# Patient Record
Sex: FEMALE | Race: BLACK OR AFRICAN AMERICAN | Employment: OTHER | URBAN - METROPOLITAN AREA
[De-identification: names, ages, dates, MRNs, and addresses within clinical notes are randomized per-mention and may not be internally consistent; named-entity substitution may affect disease eponyms.]

---

## 2022-07-10 ENCOUNTER — HOSPITAL ENCOUNTER (EMERGENCY)
Age: 32
Discharge: HOME | End: 2022-07-10
Attending: EMERGENCY MEDICINE
Payer: MEDICAID

## 2022-07-10 ENCOUNTER — APPOINTMENT (OUTPATIENT)
Dept: GENERAL RADIOLOGY | Age: 32
End: 2022-07-10
Attending: EMERGENCY MEDICINE
Payer: MEDICAID

## 2022-07-10 VITALS
DIASTOLIC BLOOD PRESSURE: 106 MMHG | WEIGHT: 220 LBS | HEIGHT: 62 IN | HEART RATE: 84 BPM | RESPIRATION RATE: 20 BRPM | BODY MASS INDEX: 40.48 KG/M2 | TEMPERATURE: 98 F | OXYGEN SATURATION: 98 % | SYSTOLIC BLOOD PRESSURE: 162 MMHG

## 2022-07-10 DIAGNOSIS — R07.9 CHEST PAIN, UNSPECIFIED TYPE: Primary | ICD-10-CM

## 2022-07-10 LAB
ALBUMIN SERPL-MCNC: 4.1 G/DL (ref 3.5–5)
ALBUMIN/GLOB SERPL: 0.9 {RATIO} (ref 1.1–2.2)
ALP SERPL-CCNC: 106 U/L (ref 45–117)
ALT SERPL-CCNC: 19 U/L (ref 12–78)
ANION GAP SERPL CALC-SCNC: 10 MMOL/L (ref 5–15)
AST SERPL-CCNC: 11 U/L (ref 15–37)
BASOPHILS # BLD: 0.1 K/UL (ref 0–0.1)
BASOPHILS NFR BLD: 1 % (ref 0–1)
BILIRUB SERPL-MCNC: 0.3 MG/DL (ref 0.2–1)
BUN SERPL-MCNC: 8 MG/DL (ref 6–20)
BUN/CREAT SERPL: 9 (ref 12–20)
CALCIUM SERPL-MCNC: 8.9 MG/DL (ref 8.5–10.1)
CHLORIDE SERPL-SCNC: 106 MMOL/L (ref 97–108)
CO2 SERPL-SCNC: 27 MMOL/L (ref 21–32)
CREAT SERPL-MCNC: 0.94 MG/DL (ref 0.55–1.02)
DIFFERENTIAL METHOD BLD: ABNORMAL
EOSINOPHIL # BLD: 0.1 K/UL (ref 0–0.4)
EOSINOPHIL NFR BLD: 1 % (ref 0–7)
ERYTHROCYTE [DISTWIDTH] IN BLOOD BY AUTOMATED COUNT: 15.9 % (ref 11.5–14.5)
GLOBULIN SER CALC-MCNC: 4.5 G/DL (ref 2–4)
GLUCOSE SERPL-MCNC: 85 MG/DL (ref 65–100)
HCG SERPL QL: NEGATIVE
HCT VFR BLD AUTO: 38.5 % (ref 35–47)
HGB BLD-MCNC: 12.1 G/DL (ref 11.5–16)
IMM GRANULOCYTES # BLD AUTO: 0 K/UL (ref 0–0.04)
IMM GRANULOCYTES NFR BLD AUTO: 0 % (ref 0–0.5)
LIPASE SERPL-CCNC: 89 U/L (ref 73–393)
LYMPHOCYTES # BLD: 2.8 K/UL (ref 0.8–3.5)
LYMPHOCYTES NFR BLD: 32 % (ref 12–49)
MCH RBC QN AUTO: 24.4 PG (ref 26–34)
MCHC RBC AUTO-ENTMCNC: 31.4 G/DL (ref 30–36.5)
MCV RBC AUTO: 77.6 FL (ref 80–99)
MONOCYTES # BLD: 0.5 K/UL (ref 0–1)
MONOCYTES NFR BLD: 5 % (ref 5–13)
NEUTS SEG # BLD: 5.2 K/UL (ref 1.8–8)
NEUTS SEG NFR BLD: 61 % (ref 32–75)
NRBC # BLD: 0 K/UL (ref 0–0.01)
NRBC BLD-RTO: 0 PER 100 WBC
PLATELET # BLD AUTO: 300 K/UL (ref 150–400)
PMV BLD AUTO: 11.2 FL (ref 8.9–12.9)
POTASSIUM SERPL-SCNC: 3.8 MMOL/L (ref 3.5–5.1)
PROT SERPL-MCNC: 8.6 G/DL (ref 6.4–8.2)
RBC # BLD AUTO: 4.96 M/UL (ref 3.8–5.2)
SODIUM SERPL-SCNC: 143 MMOL/L (ref 136–145)
TROPONIN-HIGH SENSITIVITY: <4 NG/L (ref 0–51)
WBC # BLD AUTO: 8.6 K/UL (ref 3.6–11)

## 2022-07-10 PROCEDURE — 96374 THER/PROPH/DIAG INJ IV PUSH: CPT

## 2022-07-10 PROCEDURE — 85025 COMPLETE CBC W/AUTO DIFF WBC: CPT

## 2022-07-10 PROCEDURE — 99285 EMERGENCY DEPT VISIT HI MDM: CPT

## 2022-07-10 PROCEDURE — 84484 ASSAY OF TROPONIN QUANT: CPT

## 2022-07-10 PROCEDURE — 80053 COMPREHEN METABOLIC PANEL: CPT

## 2022-07-10 PROCEDURE — 71046 X-RAY EXAM CHEST 2 VIEWS: CPT

## 2022-07-10 PROCEDURE — 84703 CHORIONIC GONADOTROPIN ASSAY: CPT

## 2022-07-10 PROCEDURE — 83690 ASSAY OF LIPASE: CPT

## 2022-07-10 PROCEDURE — 74011250636 HC RX REV CODE- 250/636: Performed by: EMERGENCY MEDICINE

## 2022-07-10 PROCEDURE — 36415 COLL VENOUS BLD VENIPUNCTURE: CPT

## 2022-07-10 PROCEDURE — 93005 ELECTROCARDIOGRAM TRACING: CPT

## 2022-07-10 RX ORDER — KETOROLAC TROMETHAMINE 30 MG/ML
30 INJECTION, SOLUTION INTRAMUSCULAR; INTRAVENOUS
Status: COMPLETED | OUTPATIENT
Start: 2022-07-10 | End: 2022-07-10

## 2022-07-10 RX ORDER — LABETALOL 300 MG/1
300 TABLET, FILM COATED ORAL 2 TIMES DAILY
COMMUNITY
End: 2022-09-07 | Stop reason: ALTCHOICE

## 2022-07-10 RX ADMIN — KETOROLAC TROMETHAMINE 30 MG: 30 INJECTION, SOLUTION INTRAMUSCULAR; INTRAVENOUS at 18:24

## 2022-07-10 NOTE — ED PROVIDER NOTES
7:19 PM  Change of shift. Care of patient taken over from Dr. Etelvina Leonardo; H&P reviewed, bedside handoff complete. Awaiting diagnostics and disposition    VITAL SIGNS:  Patient Vitals for the past 4 hrs:   BP   07/10/22 2017 (!) 162/106         LABS:  Recent Results (from the past 6 hour(s))   EKG, 12 LEAD, INITIAL    Collection Time: 07/10/22  5:25 PM   Result Value Ref Range    Ventricular Rate 82 BPM    Atrial Rate 82 BPM    P-R Interval 110 ms    QRS Duration 88 ms    Q-T Interval 356 ms    QTC Calculation (Bezet) 415 ms    Calculated P Axis 7 degrees    Calculated R Axis 33 degrees    Calculated T Axis 18 degrees    Diagnosis       Sinus rhythm with short NM  Otherwise normal ECG  No previous ECGs available     CBC WITH AUTOMATED DIFF    Collection Time: 07/10/22  6:20 PM   Result Value Ref Range    WBC 8.6 3.6 - 11.0 K/uL    RBC 4.96 3.80 - 5.20 M/uL    HGB 12.1 11.5 - 16.0 g/dL    HCT 38.5 35.0 - 47.0 %    MCV 77.6 (L) 80.0 - 99.0 FL    MCH 24.4 (L) 26.0 - 34.0 PG    MCHC 31.4 30.0 - 36.5 g/dL    RDW 15.9 (H) 11.5 - 14.5 %    PLATELET 791 345 - 081 K/uL    MPV 11.2 8.9 - 12.9 FL    NRBC 0.0 0  WBC    ABSOLUTE NRBC 0.00 0.00 - 0.01 K/uL    NEUTROPHILS 61 32 - 75 %    LYMPHOCYTES 32 12 - 49 %    MONOCYTES 5 5 - 13 %    EOSINOPHILS 1 0 - 7 %    BASOPHILS 1 0 - 1 %    IMMATURE GRANULOCYTES 0 0.0 - 0.5 %    ABS. NEUTROPHILS 5.2 1.8 - 8.0 K/UL    ABS. LYMPHOCYTES 2.8 0.8 - 3.5 K/UL    ABS. MONOCYTES 0.5 0.0 - 1.0 K/UL    ABS. EOSINOPHILS 0.1 0.0 - 0.4 K/UL    ABS. BASOPHILS 0.1 0.0 - 0.1 K/UL    ABS. IMM.  GRANS. 0.0 0.00 - 0.04 K/UL    DF AUTOMATED     METABOLIC PANEL, COMPREHENSIVE    Collection Time: 07/10/22  6:20 PM   Result Value Ref Range    Sodium 143 136 - 145 mmol/L    Potassium 3.8 3.5 - 5.1 mmol/L    Chloride 106 97 - 108 mmol/L    CO2 27 21 - 32 mmol/L    Anion gap 10 5 - 15 mmol/L    Glucose 85 65 - 100 mg/dL    BUN 8 6 - 20 MG/DL    Creatinine 0.94 0.55 - 1.02 MG/DL    BUN/Creatinine ratio 9 (L) 12 - 20      GFR est AA >60 >60 ml/min/1.73m2    GFR est non-AA >60 >60 ml/min/1.73m2    Calcium 8.9 8.5 - 10.1 MG/DL    Bilirubin, total 0.3 0.2 - 1.0 MG/DL    ALT (SGPT) 19 12 - 78 U/L    AST (SGOT) 11 (L) 15 - 37 U/L    Alk. phosphatase 106 45 - 117 U/L    Protein, total 8.6 (H) 6.4 - 8.2 g/dL    Albumin 4.1 3.5 - 5.0 g/dL    Globulin 4.5 (H) 2.0 - 4.0 g/dL    A-G Ratio 0.9 (L) 1.1 - 2.2     LIPASE    Collection Time: 07/10/22  6:20 PM   Result Value Ref Range    Lipase 89 73 - 393 U/L   HCG QL SERUM    Collection Time: 07/10/22  6:20 PM   Result Value Ref Range    HCG, Ql. Negative NEG     TROPONIN-HIGH SENSITIVITY    Collection Time: 07/10/22  6:21 PM   Result Value Ref Range    Troponin-High Sensitivity <4 0 - 51 ng/L        IMAGING:  XR CHEST PA LAT   Final Result    No acute cardiopulmonary disease radiographically. .  . Medications During Visit:  Medications   ketorolac (TORADOL) injection 30 mg (30 mg IntraVENous Given 7/10/22 1824)         DECISION MAKING:  Tanner Sandoval is a 27 y.o. female who comes in as above. Well-appearing patient. Most likely musculoskeletal in nature. We will follow-up with PCP. Return as needed      IMPRESSION:  1.  Chest pain, unspecified type        DISPOSITION:  Discharged      Discharge Medication List as of 7/10/2022  8:01 PM           Follow-up Information       Follow up With Specialties Details Why Contact Info    Steph Garza  [facility] Vascular Surgery, Interventional Cardiology Physician, Cardiovascular Disease Physician Schedule an appointment as soon as possible for a visit  As needed [address] [place] [address]  [phone]      [facility] [address] [place] [address] Emergency Medicine  As needed, If symptoms worsen [id number] [address]   As needed [address] [phone]              The patient is asked to follow-up with their primary care provider in the next several days. They are to call tomorrow for an appointment. The patient is asked to return promptly for any increased concerns or worsening of symptoms. They can return to this emergency department or any other emergency department.

## 2022-07-10 NOTE — DISCHARGE INSTRUCTIONS
Thank you for allowing us to provide you with medical care today. We realize that you have many choices for your emergency care needs. We thank you for choosing [facility]. Please choose us in the future for any continued health care needs. The exam and treatment you received in the Emergency Department were for an emergent problem and are not intended as complete care. It is important that you follow up with a doctor, nurse practitioner, or physician's assistant for ongoing care. If your symptoms worsen or you do not improve as expected and you are unable to reach your usual health care provider, you should return to the Emergency Department. We are available 24 hours a day. Please make an appointment with your health care provider(s) for follow up of your Emergency Department visit. Take this sheet with you when you go to your follow-up visit.

## 2022-07-10 NOTE — ED PROVIDER NOTES
26-year-old female presents with chest pain. Patient reports pain is been ongoing for the last 3 days. Is worse with deep inspiration. She characterizes it as a pulling in her chest.  She rates it a 6 out of 10. She denies history of similar in the past.  She has not on any birth control and denies history of blood clot. She denies shortness of breath, cough, fever, chills, abdominal pain, GI or urinary symptoms. Past Medical History:   Diagnosis Date    Gestational hypertension        History reviewed. No pertinent surgical history. History reviewed. No pertinent family history. Social History     Socioeconomic History    Marital status: SINGLE     Spouse name: Not on file    Number of children: Not on file    Years of education: Not on file    Highest education level: Not on file   Occupational History    Not on file   Tobacco Use    Smoking status: Never Smoker    Smokeless tobacco: Never Used   Substance and Sexual Activity    Alcohol use: Not on file     Comment: socially     Drug use: Never    Sexual activity: Not on file   Other Topics Concern    Not on file   Social History Narrative    Not on file     Social Determinants of Health     Financial Resource Strain:     Difficulty of Paying Living Expenses: Not on file   Food Insecurity:     Worried About [address] [employer] in the Last Year: Not on file    [employer] of Food in the Last Year: Not on file   Transportation Needs:     Lack of Transportation (Medical): Not on file    Lack of Transportation (Non-Medical):  Not on file   Physical Activity:     Days of Exercise per Week: Not on file    Minutes of Exercise per Session: Not on file   Stress:     Feeling of Stress : Not on file   Social Connections:     Frequency of Communication with Friends and Family: Not on file    Frequency of Social Gatherings with Friends and Family: Not on file    Attends Caoda[employer] Services: Not on file   [employer]s or Organizations: Not on file    Attends Club or Organization Meetings: Not on file    Marital Status: Not on file   Intimate Partner Violence:     Fear of Current or Ex-Partner: Not on file    Emotionally Abused: Not on file    Physically Abused: Not on file    Sexually Abused: Not on file   Housing Stability:     Unable to Pay for Housing in the Last Year: Not on file    Number of [place] in the Last Year: Not on file    Unstable Housing in the Last Year: Not on file         ALLERGIES: Patient has no known allergies. Review of Systems   Constitutional: Negative for fever. HENT: Negative for rhinorrhea. Respiratory: Negative for shortness of breath. Cardiovascular: Positive for chest pain. Gastrointestinal: Negative for abdominal pain. Genitourinary: Negative for dysuria. Musculoskeletal: Negative for back pain. Skin: Negative for wound. Neurological: Negative for headaches. Psychiatric/Behavioral: Negative for confusion. Vitals:    07/10/22 1722   BP: (!) 170/105   Pulse: 84   Resp: 20   Temp: 98 °F (36.7 °C)   SpO2: 98%   Weight: 99.8 kg (220 lb)   Height: 5' 2\" (1.575 m)            Physical Exam  Vitals and nursing note reviewed. Constitutional:       General: She is not in acute distress. Appearance: Normal appearance. She is well-developed. She is not ill-appearing, toxic-appearing or diaphoretic. HENT:      Head: Normocephalic and atraumatic. Eyes:      Extraocular Movements: Extraocular movements intact. Cardiovascular:      Rate and Rhythm: Normal rate. Pulses: Normal pulses. Heart sounds: Normal heart sounds. Pulmonary:      Effort: Pulmonary effort is normal. No respiratory distress. Breath sounds: Normal breath sounds. Abdominal:      General: There is no distension. Musculoskeletal:         General: Normal range of motion. Cervical back: Normal range of motion. Skin:     General: Skin is dry.    Neurological:      Mental Status: She is alert and oriented to person, place, and time. Psychiatric:         Mood and Affect: Mood normal.          MDM  Number of Diagnoses or Management Options  Diagnosis management comments:     Patient presents with chest pain. Differentials include but are not limited to musculoskeletal pain, pneumonia, less likely ACS. Patient is PERC negative. Troponin normal.  White blood cell count, hemoglobin, renal and liver function normal.  Discussed my clinical impression(s), any labs and/or radiology results with the patient. I answered any questions and addressed any concerns. Discussed the importance of following up with their primary care physician and/or specialist(s). Discussed signs or symptoms that would warrant return back to the ER for further evaluation. The patient is agreeable with discharge. EKG shows sinus rhythm at a rate of 82, normal intervals, normal axis, no ischemic changes.          Procedures

## 2022-07-10 NOTE — ED TRIAGE NOTES
Patient reports 3 days of intermittent chest pain. Patient reports increased pain with deep breathing.

## 2022-07-11 LAB
ATRIAL RATE: 82 BPM
CALCULATED P AXIS, ECG09: 7 DEGREES
CALCULATED R AXIS, ECG10: 33 DEGREES
CALCULATED T AXIS, ECG11: 18 DEGREES
DIAGNOSIS, 93000: NORMAL
P-R INTERVAL, ECG05: 110 MS
Q-T INTERVAL, ECG07: 356 MS
QRS DURATION, ECG06: 88 MS
QTC CALCULATION (BEZET), ECG08: 415 MS
VENTRICULAR RATE, ECG03: 82 BPM

## 2022-07-11 NOTE — ED NOTES
Pain assessment on discharge was : pt denies pain  Condition Stable  Patient discharged to home  Patient education was completed  Education taught to patient  Teaching method used was handout and verbal  Understanding of teaching was good  Patient was discharged ambulatory  Discharged with family  Valuables were given to:

## 2022-09-07 ENCOUNTER — OFFICE VISIT (OUTPATIENT)
Dept: PRIMARY CARE CLINIC | Age: 32
End: 2022-09-07
Payer: MEDICAID

## 2022-09-07 VITALS
BODY MASS INDEX: 39.93 KG/M2 | OXYGEN SATURATION: 98 % | DIASTOLIC BLOOD PRESSURE: 84 MMHG | HEART RATE: 100 BPM | HEIGHT: 62 IN | TEMPERATURE: 97.5 F | RESPIRATION RATE: 16 BRPM | WEIGHT: 217 LBS | SYSTOLIC BLOOD PRESSURE: 139 MMHG

## 2022-09-07 DIAGNOSIS — R51.9 FREQUENT HEADACHES: ICD-10-CM

## 2022-09-07 DIAGNOSIS — R07.9 CHEST PAIN, UNSPECIFIED TYPE: ICD-10-CM

## 2022-09-07 DIAGNOSIS — D50.9 IRON DEFICIENCY ANEMIA, UNSPECIFIED IRON DEFICIENCY ANEMIA TYPE: ICD-10-CM

## 2022-09-07 DIAGNOSIS — E55.9 VITAMIN D DEFICIENCY: ICD-10-CM

## 2022-09-07 DIAGNOSIS — Z11.59 NEED FOR HEPATITIS C SCREENING TEST: ICD-10-CM

## 2022-09-07 DIAGNOSIS — I10 PRIMARY HYPERTENSION: Primary | ICD-10-CM

## 2022-09-07 DIAGNOSIS — E66.01 CLASS 2 SEVERE OBESITY DUE TO EXCESS CALORIES WITH SERIOUS COMORBIDITY AND BODY MASS INDEX (BMI) OF 36.0 TO 36.9 IN ADULT (HCC): ICD-10-CM

## 2022-09-07 DIAGNOSIS — Z76.89 ENCOUNTER TO ESTABLISH CARE: ICD-10-CM

## 2022-09-07 DIAGNOSIS — R14.0 ABDOMINAL BLOATING: ICD-10-CM

## 2022-09-07 PROCEDURE — 99204 OFFICE O/P NEW MOD 45 MIN: CPT

## 2022-09-07 RX ORDER — AMLODIPINE BESYLATE 5 MG/1
TABLET ORAL
COMMUNITY
Start: 2022-08-22

## 2022-09-07 RX ORDER — CHOLECALCIFEROL (VITAMIN D3) 125 MCG
CAPSULE ORAL
COMMUNITY

## 2022-09-07 RX ORDER — DICYCLOMINE HYDROCHLORIDE 10 MG/1
20 CAPSULE ORAL
Qty: 12 CAPSULE | Refills: 0 | Status: SHIPPED | OUTPATIENT
Start: 2022-09-07

## 2022-09-07 RX ORDER — DROSPIRENONE 4 MG/1
TABLET, FILM COATED ORAL
COMMUNITY

## 2022-09-07 RX ORDER — HYDROCHLOROTHIAZIDE 25 MG/1
25 TABLET ORAL DAILY
Qty: 30 TABLET | Refills: 1 | Status: SHIPPED | OUTPATIENT
Start: 2022-09-07 | End: 2022-10-31

## 2022-09-07 NOTE — PROGRESS NOTES
Chief Complaint   Patient presents with    [facility] Care    Hypertension       Health Maintenance Due   Topic    Hepatitis C Screening     Depression Screen         1. Have you been to the ER, urgent care clinic since your last visit? Hospitalized since your last visit? No    2. Have you seen or consulted any other health care providers outside of the [address] [place] [address] since your last visit? Include any pap smears or colon screening.  No    Visit Vitals  /84 (BP 1 Location: Right arm, BP Patient Position: Sitting, BP Cuff Size: Adult long)   Pulse 100   Temp 97.5 °F (36.4 °C) (Temporal)   Resp 16   Ht 5' 2\" (1.575 m)   Wt 217 lb (98.4 kg)   LMP 08/10/2022   SpO2 98%   BMI 39.69 kg/m²

## 2022-09-07 NOTE — PROGRESS NOTES
[facility]   Art Zhang 64[address], [address] [facility]  P: [phone]  F: [phone]    SUBJECTIVE     HPI:     Miranda Yun is a 34 y.o. female who is seen in the clinic for   Chief Complaint   Patient presents with    Establish Care    Hypertension          New patient to our practice, here to establish care. In July she was seen in the ED for chest pain, was diagnosed with muscle spasms. She has been taking Ibuprofen with improvement of symptoms, but still has chest pain occasionally. Chest pain has not changed in nature since initial evaluation. Hypertension: She was referred in the ED to Dr Sparkle Casillas (Cardiologist) on July 29th and was prescribed Amlodipine 5mg. She checks her blood pressures at home, readings are usually 140/80's. She does not notice when blood pressure is high; denies vision changes, chest pains, headaches. No side effects noted from Amlodipine but she has not seen a change in her blood pressure at home. She is scheduled to follow up with Dr Sparkle Casillas in October. She has a history of iron deficiency. Her labs from the ED in July show low MCV, low MCH. She has iron supplements at home and is curious if she should take iron supplements again. She has a history of gastritis. She c/o bloating, can wake up in the morning and be bloated. Pastas and rice provoke bloating. She was told to follow a high fiber diet at the time of her diagnosis of gastritis. Bowel movements are regular, but has not had a BM today. She has a history of frequent headaches. She attributes this to birth control that she was taking years ago. Has not had a migraine since birth control was changed. Her OB-GYN is [facility]. Pap smear done 8/1. Had a transvaginal ultrasound done for irregular periods which showed a uterine fibroid that is the size of a grape that they are monitoring. She takes oral contraceptives for birth control.      Exercise: No regular exercise, she is busy taking care of her three young children. Sleep: 6-8 hours per night, no issues falling or staying asleep. Diet: Eats small meals throughout the day. Tries to limit salt intake. There are no problems to display for this patient. Past Medical History:   Diagnosis Date    Gestational hypertension      History reviewed. No pertinent surgical history. Social History     Socioeconomic History    Marital status: OTHER     Spouse name: Not on file    Number of children: Not on file    Years of education: Not on file    Highest education level: Not on file   Occupational History    Not on file   Tobacco Use    Smoking status: Never    Smokeless tobacco: Never   Vaping Use    Vaping Use: Never used   Substance and Sexual Activity    Alcohol use: Not on file     Comment: socially     Drug use: Never    Sexual activity: Not on file   Other Topics Concern    Not on file   Social History Narrative    Not on file     Social Determinants of Health     Financial Resource Strain: Medium Risk    Difficulty of Paying Living Expenses: Somewhat hard   Food Insecurity: Food Insecurity Present    Worried About Running Out of Food in the Last Year: Never true    Ran Out of Food in the Last Year: Sometimes true   Transportation Needs: Not on file   Physical Activity: Insufficiently Active    Days of Exercise per Week: 2 days    Minutes of Exercise per Session: 10 min   Stress: Not on file   Social Connections: Not on file   Intimate Partner Violence: Not At Risk    Fear of Current or Ex-Partner: No    Emotionally Abused: No    Physically Abused: No    Sexually Abused: No   Housing Stability: Not on file     History reviewed. No pertinent family history.   Immunization History   Administered Date(s) Administered    COVID-19, PFIZER PURPLE top, DILUTE for use, (age 15 y+), IM, 30mcg/0.3mL 09/18/2021, 10/09/2021      No Known Allergies    Admission on 07/10/2022, Discharged on 07/10/2022   Component Date Value Ref Range Status    Ventricular Rate 07/10/2022 82  BPM Final    Atrial Rate 07/10/2022 82  BPM Final    P-R Interval 07/10/2022 110  ms Final    QRS Duration 07/10/2022 88  ms Final    Q-T Interval 07/10/2022 356  ms Final    QTC Calculation (Bezet) 07/10/2022 415  ms Final    Calculated P Axis 07/10/2022 7  degrees Final    Calculated R Axis 07/10/2022 33  degrees Final    Calculated T Axis 07/10/2022 18  degrees Final    Diagnosis 07/10/2022    Final                    Value:Sinus rhythm with short GA  Otherwise normal ECG  No previous ECGs available  Confirmed by Alida Gallo MD ([id number]) on 7/11/2022 1:29:07 PM      WBC 07/10/2022 8.6  3.6 - 11.0 K/uL Final    RBC 07/10/2022 4.96  3.80 - 5.20 M/uL Final    HGB 07/10/2022 12.1  11.5 - 16.0 g/dL Final    HCT 07/10/2022 38.5  35.0 - 47.0 % Final    MCV 07/10/2022 77.6 (A) 80.0 - 99.0 FL Final    MCH 07/10/2022 24.4 (A) 26.0 - 34.0 PG Final    MCHC 07/10/2022 31.4  30.0 - 36.5 g/dL Final    RDW 07/10/2022 15.9 (A) 11.5 - 14.5 % Final    PLATELET 03/71/9990 776  150 - 400 K/uL Final    MPV 07/10/2022 11.2  8.9 - 12.9 FL Final    NRBC 07/10/2022 0.0  0  WBC Final    ABSOLUTE NRBC 07/10/2022 0.00  0.00 - 0.01 K/uL Final    NEUTROPHILS 07/10/2022 61  32 - 75 % Final    LYMPHOCYTES 07/10/2022 32  12 - 49 % Final    MONOCYTES 07/10/2022 5  5 - 13 % Final    EOSINOPHILS 07/10/2022 1  0 - 7 % Final    BASOPHILS 07/10/2022 1  0 - 1 % Final    IMMATURE GRANULOCYTES 07/10/2022 0  0.0 - 0.5 % Final    ABS. NEUTROPHILS 07/10/2022 5.2  1.8 - 8.0 K/UL Final    ABS. LYMPHOCYTES 07/10/2022 2.8  0.8 - 3.5 K/UL Final    ABS. MONOCYTES 07/10/2022 0.5  0.0 - 1.0 K/UL Final    ABS. EOSINOPHILS 07/10/2022 0.1  0.0 - 0.4 K/UL Final    ABS. BASOPHILS 07/10/2022 0.1  0.0 - 0.1 K/UL Final    ABS. IMM.  GRANS. 07/10/2022 0.0  0.00 - 0.04 K/UL Final    DF 07/10/2022 AUTOMATED    Final    Sodium 07/10/2022 143  136 - 145 mmol/L Final    Potassium 07/10/2022 3.8  3.5 - 5.1 mmol/L Final    Chloride 07/10/2022 106  97 - 108 mmol/L Final    CO2 07/10/2022 27  21 - 32 mmol/L Final    Anion gap 07/10/2022 10  5 - 15 mmol/L Final    Glucose 07/10/2022 85  65 - 100 mg/dL Final    BUN 07/10/2022 8  6 - 20 MG/DL Final    Creatinine 07/10/2022 0.94  0.55 - 1.02 MG/DL Final    BUN/Creatinine ratio 07/10/2022 9 (A) 12 - 20   Final    GFR est AA 07/10/2022 >60  >60 ml/min/1.73m2 Final    GFR est non-AA 07/10/2022 >60  >60 ml/min/1.73m2 Final    Estimated GFR is calculated using the IDMS-traceable Modification of Diet in Renal Disease (MDRD) Study equation, reported for both  Americans (GFRAA) and non- Americans (GFRNA), and normalized to 1.73m2 body surface area. The physician must decide which value applies to the patient. Calcium 07/10/2022 8.9  8.5 - 10.1 MG/DL Final    Bilirubin, total 07/10/2022 0.3  0.2 - 1.0 MG/DL Final    ALT (SGPT) 07/10/2022 19  12 - 78 U/L Final    AST (SGOT) 07/10/2022 11 (A) 15 - 37 U/L Final    Alk. phosphatase 07/10/2022 106  45 - 117 U/L Final    Protein, total 07/10/2022 8.6 (A) 6.4 - 8.2 g/dL Final    Albumin 07/10/2022 4.1  3.5 - 5.0 g/dL Final    Globulin 07/10/2022 4.5 (A) 2.0 - 4.0 g/dL Final    A-G Ratio 07/10/2022 0.9 (A) 1.1 - 2.2   Final    Troponin-High Sensitivity 07/10/2022 <4  0 - 51 ng/L Final    Comment: A HS troponin value change of (+ or -) 50% or more below the 99th percentile, in a 1/2/3 hr interval represents a significant change. Clinical correcation is recommended. A HS troponin value change of (+ or -) 20% or above the 99th percentile, in a 1/2/3 hr interval represents a significant change. Clinical correlation is recommended.   99th Percentile:   Women: 0-51 ng/L                                                                Men:   0-76 ng/L      Lipase 07/10/2022 89  73 - 393 U/L Final    HCG, Ql. 07/10/2022 Negative  NEG   Final    The serum pregnancy test becomes positive at about the time of implantation of the conceptus and approximates a quantitative bHCG value of >5 mIU/ML. XR CHEST PA LAT  Narrative: INDICATION:  chest pain. EXAM: 2 VIEW CHEST RADIOGRAPH. COMPARISON: None. FINDINGS: Image quality is degraded by nonstandard positioning in the lateral  view. Frontal and lateral views of the chest show clear lungs. . The heart,  mediastinum and pulmonary vasculature are upper normal.  The bony thorax is  unremarkable for age. ..  Impression:  No acute cardiopulmonary disease radiographically. .  . Current Outpatient Medications   Medication Sig Dispense Refill    amLODIPine (NORVASC) 5 mg tablet       drospirenone, contraceptive, (Slynd) 4 mg (28) tab Take  by mouth. naproxen sodium (Aleve) 220 mg cap Take  by mouth. labetaloL (NORMODYNE) 300 mg tablet Take 300 mg by mouth two (2) times a day. (Patient not taking: Reported on 9/7/2022)             The past medical history, past surgical history, and family history were reviewed and updated in the medical record. Lab values/Imaging were reviewed. The medications were reviewed and updated in the medical record. Immunizations were reviewed and updated in the medical record. All relevant preventative screenings reviewed and updated in the medical record. REVIEW OF SYSTEMS   Review of Systems   Constitutional:  Negative for chills, fever and malaise/fatigue. Respiratory:  Negative for cough, shortness of breath and wheezing. Cardiovascular:  Negative for chest pain, palpitations and leg swelling. Gastrointestinal:  Positive for abdominal pain. Negative for blood in stool, constipation, diarrhea, heartburn, melena, nausea and vomiting. Neurological:  Negative for dizziness, weakness and headaches.        PHYSICAL EXAM   /84 (BP 1 Location: Right arm, BP Patient Position: Sitting, BP Cuff Size: Adult long)   Pulse 100   Temp 97.5 °F (36.4 °C) (Temporal)   Resp 16   Ht 5' 2\" (1.575 m)   Wt 217 lb (98.4 kg)   LMP 08/10/2022   SpO2 98%   BMI 39.69 kg/m² Physical Exam  Constitutional:       General: She is not in acute distress. Appearance: She is obese. She is not toxic-appearing. Cardiovascular:      Rate and Rhythm: Normal rate and regular rhythm. Pulses: Normal pulses. Heart sounds: Normal heart sounds. Pulmonary:      Effort: Pulmonary effort is normal. No respiratory distress. Breath sounds: Normal breath sounds. No wheezing or rales. Abdominal:      General: There is distension. Palpations: There is no mass. Tenderness: There is no abdominal tenderness. There is no guarding. Hernia: No hernia is present. Musculoskeletal:      Right lower leg: No edema. Left lower leg: No edema. Skin:     General: Skin is warm and dry. Neurological:      Mental Status: She is alert and oriented to person, place, and time. Mental status is at baseline. Motor: No weakness. Gait: Gait normal.   Psychiatric:         Mood and Affect: Mood normal.         Behavior: Behavior normal.         Thought Content: Thought content normal.         Judgment: Judgment normal.          ASSESSMENT/ PLAN   Below is the assessment and plan developed based on review of pertinent history, physical exam, labs, studies, and medications. 1. Primary hypertension  -     Continue Amlodipine 5mg daily. I added HCTZ 25mg daily. Potential side effects discussed. Informed of teratogenicity of HCTZ and to stop taking HCTZ, RTC if she believes she could be pregnant.   - Continue to monitor home blood pressure. Discussed correct method of taking home BP. Keep log of readings and send to me via [employer] or bring log to next appointment.   [facility] precautions discussed. - Planned to follow up with Cardiologist next month. - hydroCHLOROthiazide (HYDRODIURIL) 25 mg tablet; Take 1 Tablet by mouth daily. , Normal, Disp-30 Tablet, R-1  -     CBC WITH AUTOMATED DIFF; Future  -     METABOLIC PANEL, COMPREHENSIVE; Future  2.  Abdominal bloating  - Follow FODMAP diet. Avoid pastas, rice, other triggers. - Continue OTC Gas-X  - For pain associated with bloating I prescribed Bentyl. Potential side effects discussed. Education sent via [employer].  - dicyclomine (BENTYL) 10 mg capsule as needed  - Consider GI referral if no improvement or if symptoms worsen. 3. Frequent headaches  - None recently after change in OCP prescription. Continue current medication as prescribed by OB-GYN. 4. Chest pain, unspecified type   - EKG in ED reviewed. Pain has decreased in frequency and severity since seen in ED. No signs of ACS in clinic.   - Continue Ibuprofen as needed for pain. [facility] precautions discussed. 5. Iron deficiency anemia, unspecified iron deficiency anemia type   -      Patient previously taking iron supplements and tolerated well. Can resume taking OTC ferrous sulfate with multivitamin that contains Vitamin C.  - Will recheck CBC, pending.  - CBC WITH AUTOMATED DIFF; Future  6. Class 2 severe obesity due to excess calories with serious comorbidity and body mass index (BMI) of 36.0 to 36.9 in adult [facility])  - Advised patient to start regular exercise as able. Continue low salt diet. 7. Need for hepatitis C screening test  -     HEPATITIS C AB; Future  8. Vitamin D deficiency  -     VITAMIN D, 25 HYDROXY; Future  9. Encounter to establish care  -     THYROID CASCADE PROFILE; Future  -     CBC WITH AUTOMATED DIFF; Future  -     METABOLIC PANEL, COMPREHENSIVE; Future  -     LIPID PANEL; Future  -     MICROALBUMIN, UR, RAND W/ MICROALB/CREAT RATIO; Future           RTC in 1 month for blood pressure log review, annual physical.     Disclaimer:  Advised patient to call back or return to office if symptoms worsen/change/persist.  Discussed expected course/resolution/complications of diagnosis in detail with patient. Medication risks/benefits/alternatives discussed with patient.   Patient was given an after visit summary which includes diagnoses, current medications, & vitals. Discussed patient instructions and advised to read to all patient instructions regarding care. Patient expressed understanding with the diagnosis and plan.        Ihsan Grande NP  9/7/2022

## 2022-12-08 DIAGNOSIS — E55.9 VITAMIN D DEFICIENCY: Primary | ICD-10-CM

## 2022-12-08 RX ORDER — ERGOCALCIFEROL 1.25 MG/1
50000 CAPSULE ORAL
Qty: 8 CAPSULE | Refills: 0 | Status: SHIPPED | OUTPATIENT
Start: 2022-12-08

## 2023-01-27 ENCOUNTER — OFFICE VISIT (OUTPATIENT)
Dept: PRIMARY CARE CLINIC | Age: 33
End: 2023-01-27
Payer: MEDICAID

## 2023-01-27 VITALS
DIASTOLIC BLOOD PRESSURE: 83 MMHG | TEMPERATURE: 97.1 F | HEIGHT: 62 IN | BODY MASS INDEX: 39.27 KG/M2 | OXYGEN SATURATION: 99 % | WEIGHT: 213.4 LBS | RESPIRATION RATE: 18 BRPM | HEART RATE: 95 BPM | SYSTOLIC BLOOD PRESSURE: 138 MMHG

## 2023-01-27 DIAGNOSIS — I10 PRIMARY HYPERTENSION: Primary | ICD-10-CM

## 2023-01-27 DIAGNOSIS — E66.01 CLASS 2 SEVERE OBESITY DUE TO EXCESS CALORIES WITH SERIOUS COMORBIDITY AND BODY MASS INDEX (BMI) OF 36.0 TO 36.9 IN ADULT (HCC): ICD-10-CM

## 2023-01-27 DIAGNOSIS — E78.00 ELEVATED LDL CHOLESTEROL LEVEL: ICD-10-CM

## 2023-01-27 DIAGNOSIS — N93.9 ABNORMAL VAGINAL BLEEDING: ICD-10-CM

## 2023-01-27 PROCEDURE — 3079F DIAST BP 80-89 MM HG: CPT

## 2023-01-27 PROCEDURE — 3075F SYST BP GE 130 - 139MM HG: CPT

## 2023-01-27 PROCEDURE — 99214 OFFICE O/P EST MOD 30 MIN: CPT

## 2023-01-27 RX ORDER — AMLODIPINE BESYLATE 5 MG/1
5 TABLET ORAL DAILY
Qty: 90 TABLET | Refills: 0 | Status: SHIPPED | OUTPATIENT
Start: 2023-01-27

## 2023-01-27 RX ORDER — LOSARTAN POTASSIUM 25 MG/1
25 TABLET ORAL DAILY
Qty: 30 TABLET | Refills: 1 | Status: SHIPPED | OUTPATIENT
Start: 2023-01-27

## 2023-01-27 NOTE — PROGRESS NOTES
Identified pt with two pt identifiers(name and ). Chief Complaint   Patient presents with    Follow-up        3 most recent PHQ Screens 2023   Little interest or pleasure in doing things Not at all   Feeling down, depressed, irritable, or hopeless Not at all   Total Score PHQ 2 0        Vitals:    23 1054 23 1059   BP: (!) 134/94 138/83   Pulse: 96 95   Resp: 18 18   Temp: 97.1 °F (36.2 °C)    TempSrc: Temporal    SpO2: 99% 99%   Weight: 213 lb 6.4 oz (96.8 kg)    Height: 5' 2\" (1.575 m)    PainSc:   0 - No pain    LMP: 2023       Health Maintenance Due   Topic Date Due    DTaP/Tdap/Td series (1 - Tdap) Never done    COVID-19 Vaccine (3 - Booster for [employer] series) 2021        1. Have you been to the ER, urgent care clinic since your last visit? Hospitalized since your last visit? No    2. Have you seen or consulted any other health care providers outside of the [address] [place] [address] since your last visit? Include any pap smears or colon screening. No    3. For patients aged 39-70: Has the patient had a colonoscopy / FIT/ Cologuard? NA - based on age      If the patient is female:    4. For patients aged 41-77: Has the patient had a mammogram within the past 2 years? NA - based on age or sex      11. For patients aged 21-65: Has the patient had a pap smear?  Yes - no Care Gap present

## 2023-01-27 NOTE — PROGRESS NOTES
[facility]   [address], [address] [facility]  P: [phone]  F: [phone]    SUBJECTIVE     HPI:     Rachna Boothe is a 34 y.o. female who is seen in the clinic for   Chief Complaint   Patient presents with    Follow-up        Established patient here for a follow up. She has a PMHx of HTN, frequent headaches, ERASMO, and obesity. Labs were drawn prior to her appointment. Abnormal results noted below:  Vitamin D low, 16.3. Took ergocalciferol and completed the course. She has not picked up vitamin D 1,000 international unit daily dose from the pharmacy yet. . Total cholesterol 170. Advised to start following a low fat diet limiting butter, cheese, red meat, greasy/fried foods and exercise regularly. MCV 74, MCH 24.4: Advised to increase dietary iron. Will recheck CBC. HTN: She is taking Amlodipine 5mg and HCTZ 25mg. At her previous appointment I added HCTZ to her regimen. Her BP is good today, 138/83. She was asked to check her BP at home and keep a log of the readings, she does not have the log with her but reports reading are consistently high 130's/80's. She does not have any more chest pains or palpitations. She wants to try a different medication for her BP. Wondering if elevated BP could be d/t having COVID-19 2 years ago. Abdominal cramping: I prescribed Bentyl last month as needed. This is working well for her. She takes this as needed. When discussing low MCH, MCV result I asked about heavy periods. She endorses this and states she has been having vaginal bleeding while taking her Slynd OCP. Had her period but continues to have vaginal bleeding, has been bleeding for total of 11 days. She does not feel lightheaded or dizzy, no dark stools, or UTI symptoms. Her Ob-Gyn is a provider with [facility]. Pap smear done 8/1/22.  Had a transvaginal ultrasound done for irregular periods which showed a uterine fibroid that is the size of a grape that they are monitoring. Obesity: Notes her diet \"could be better\". New Years Resolution is to lose weight. Walks with her kids often. Exercise: No regular exercise, she is busy taking care of her three young children. Sleep: 6-8 hours per night, no issues falling or staying asleep. Diet: Eats small meals throughout the day. Tries to limit. There are no problems to display for this patient. Past Medical History:   Diagnosis Date    Gestational hypertension      History reviewed. No pertinent surgical history. Social History     Socioeconomic History    Marital status: OTHER     Spouse name: Not on file    Number of children: Not on file    Years of education: Not on file    Highest education level: Not on file   Occupational History    Not on file   Tobacco Use    Smoking status: Never    Smokeless tobacco: Never   Vaping Use    Vaping Use: Never used   Substance and Sexual Activity    Alcohol use: Yes     Comment: socially     Drug use: Never    Sexual activity: Yes     Birth control/protection: Pill   Other Topics Concern    Not on file   Social History Narrative    Not on file     Social Determinants of Health     Financial Resource Strain: Medium Risk    Difficulty of Paying Living Expenses: Somewhat hard   Food Insecurity: Food Insecurity Present    Worried About Running Out of Food in the Last Year: Never true    Ran Out of Food in the Last Year: Sometimes true   Transportation Needs: Not on file   Physical Activity: Insufficiently Active    Days of Exercise per Week: 2 days    Minutes of Exercise per Session: 10 min   Stress: Not on file   Social Connections: Not on file   Intimate Partner Violence: Not At Risk    Fear of Current or Ex-Partner: No    Emotionally Abused: No    Physically Abused: No    Sexually Abused: No   Housing Stability: Not on file     History reviewed. No pertinent family history.   Immunization History   Administered Date(s) Administered    COVID-19, PFIZER PURPLE top, DILUTE for use, (age 15 y+), IM, 30mcg/0.3mL 09/18/2021, 10/09/2021      No Known Allergies    No visits with results within 3 Month(s) from this visit. Latest known visit with results is:   Office Visit on 09/07/2022   Component Date Value Ref Range Status    TSH 12/07/2022 1.110  0.450 - 4.500 uIU/mL Final    Comment: No apparent thyroid disorder. Additional testing not indicated. In  rare instances, Secondary Hypothyroidism as well as Subclinical  Hypothyroidism have been reported in some patients with normal TSH  values. WBC 12/07/2022 10.1  3.4 - 10.8 x10E3/uL Final    RBC 12/07/2022 4.84  3.77 - 5.28 x10E6/uL Final    HGB 12/07/2022 11.8  11.1 - 15.9 g/dL Final    HCT 12/07/2022 35.9  34.0 - 46.6 % Final    MCV 12/07/2022 74 (A)  79 - 97 fL Final    MCH 12/07/2022 24.4 (A)  26.6 - 33.0 pg Final    MCHC 12/07/2022 32.9  31.5 - 35.7 g/dL Final    RDW 12/07/2022 15.5 (A)  11.7 - 15.4 % Final    PLATELET 12/61/1262 965  150 - 450 x10E3/uL Final    NEUTROPHILS 12/07/2022 62  Not Estab. % Final    Lymphocytes 12/07/2022 30  Not Estab. % Final    MONOCYTES 12/07/2022 6  Not Estab. % Final    EOSINOPHILS 12/07/2022 1  Not Estab. % Final    BASOPHILS 12/07/2022 1  Not Estab. % Final    ABS. NEUTROPHILS 12/07/2022 6.3  1.4 - 7.0 x10E3/uL Final    Abs Lymphocytes 12/07/2022 3.1  0.7 - 3.1 x10E3/uL Final    ABS. MONOCYTES 12/07/2022 0.6  0.1 - 0.9 x10E3/uL Final    ABS. EOSINOPHILS 12/07/2022 0.1  0.0 - 0.4 x10E3/uL Final    ABS. BASOPHILS 12/07/2022 0.1  0.0 - 0.2 x10E3/uL Final    IMMATURE GRANULOCYTES 12/07/2022 0  Not Estab. % Final    ABS. IMM. GRANS.  12/07/2022 0.0  0.0 - 0.1 x10E3/uL Final    Glucose 12/07/2022 91  70 - 99 mg/dL Final    BUN 12/07/2022 8  6 - 20 mg/dL Final    Creatinine 12/07/2022 0.93  0.57 - 1.00 mg/dL Final    eGFR 12/07/2022 83  >59 mL/min/1.73 Final    BUN/Creatinine ratio 12/07/2022 9  9 - 23 Final    Sodium 12/07/2022 141  134 - 144 mmol/L Final    Potassium 12/07/2022 3.7  3.5 - 5.2 mmol/L Final    Chloride 12/07/2022 101  96 - 106 mmol/L Final    CO2 12/07/2022 25  20 - 29 mmol/L Final    Calcium 12/07/2022 9.0  8.7 - 10.2 mg/dL Final    Protein, total 12/07/2022 7.3  6.0 - 8.5 g/dL Final    Albumin 12/07/2022 4.4  3.8 - 4.8 g/dL Final    GLOBULIN, TOTAL 12/07/2022 2.9  1.5 - 4.5 g/dL Final    A-G Ratio 12/07/2022 1.5  1.2 - 2.2 Final    Bilirubin, total 12/07/2022 0.4  0.0 - 1.2 mg/dL Final    Alk. phosphatase 12/07/2022 93  44 - 121 IU/L Final    AST (SGOT) 12/07/2022 13  0 - 40 IU/L Final    ALT (SGPT) 12/07/2022 15  0 - 32 IU/L Final    Hep C Virus Ab 12/07/2022 0.1  0.0 - 0.9 s/co ratio Final    Comment:                                   Negative:     < 0.8                               Indeterminate: 0.8 - 0.9                                    Positive:     > 0.9   HCV antibody alone does not differentiate between   previous resolved infection and active infection. The CDC and current clinical guidelines recommend   that a positive HCV antibody result be followed up   with an HCV RNA test to support the diagnosis of   acute HCV infection. [facility] offers Hepatitis C   Virus (HCV) RNA, Diagnosis, BRIDGETTE (356175) and   Hepatitis C Virus (HCV) Antibody with reflex to   Quantitative Real-time PCR (311059). Cholesterol, total 12/07/2022 170  100 - 199 mg/dL Final    Triglyceride 12/07/2022 94  0 - 149 mg/dL Final    HDL Cholesterol 12/07/2022 44  >39 mg/dL Final    VLDL, calculated 12/07/2022 17  5 - 40 mg/dL Final    LDL, calculated 12/07/2022 109 (A)  0 - 99 mg/dL Final    VITAMIN D, 25-HYDROXY 12/07/2022 16.3 (A)  30.0 - 100.0 ng/mL Final    Comment: Vitamin D deficiency has been defined by the [address] practice guideline as a  level of serum 25-OH vitamin D less than 20 ng/mL (1,2). The Endocrine Society went on to further define vitamin D  insufficiency as a level between 21 and 29 ng/mL (2). 1. IOM ([place] [employer]). 2010.  Dietary reference     intakes for calcium and D. [address] [facility]: The     [employer]. 2. Camelia MF, Lizet NC, Raegan RANDALL, et al.     Evaluation, treatment, and prevention of vitamin D     deficiency: an Endocrine Society clinical practice     guideline. JCEM. 2011 Jul; 96(7):1911-30. XR CHEST PA LAT  Narrative: INDICATION:  chest pain. EXAM: 2 VIEW CHEST RADIOGRAPH. COMPARISON: None. FINDINGS: Image quality is degraded by nonstandard positioning in the lateral  view. Frontal and lateral views of the chest show clear lungs. . The heart,  mediastinum and pulmonary vasculature are upper normal.  The bony thorax is  unremarkable for age. ..  Impression:  No acute cardiopulmonary disease radiographically. .  . Current Outpatient Medications   Medication Sig Dispense Refill    hydroCHLOROthiazide (HYDRODIURIL) 25 mg tablet Take 1 Tablet by mouth daily. Appointment with PCP required before more refills will be sent. 30 Tablet 0    ergocalciferol (ERGOCALCIFEROL) 1,250 mcg (50,000 unit) capsule Take 1 Capsule by mouth every seven (7) days. 8 Capsule 0    amLODIPine (NORVASC) 5 mg tablet       drospirenone, contraceptive, (Slynd) 4 mg (28) tab Take  by mouth. naproxen sodium 220 mg cap Take  by mouth. dicyclomine (BENTYL) 10 mg capsule Take 2 Capsules by mouth four (4) times daily as needed for Abdominal Cramps. 12 Capsule 0           The past medical history, past surgical history, and family history were reviewed and updated in the medical record. Lab values/Imaging were reviewed. The medications were reviewed and updated in the medical record. Immunizations were reviewed and updated in the medical record. All relevant preventative screenings reviewed and updated in the medical record. REVIEW OF SYSTEMS   Review of Systems   Constitutional:  Negative for chills, diaphoresis, fever, malaise/fatigue and weight loss. Eyes:  Negative for blurred vision.    Respiratory: Negative for cough, shortness of breath and wheezing. Cardiovascular:  Negative for chest pain, palpitations and leg swelling. Gastrointestinal:  Negative for abdominal pain, blood in stool, constipation, diarrhea, melena, nausea and vomiting. Neurological:  Negative for dizziness and headaches. Psychiatric/Behavioral:  Negative for depression and suicidal ideas. The patient is not nervous/anxious. PHYSICAL EXAM   /83 (BP 1 Location: Right upper arm, BP Patient Position: Sitting, BP Cuff Size: Adult)   Pulse 95   Temp 97.1 °F (36.2 °C) (Temporal)   Resp 18   Ht 5' 2\" (1.575 m)   Wt 213 lb 6.4 oz (96.8 kg)   LMP 01/17/2023 (Exact Date)   SpO2 99%   BMI 39.03 kg/m²      Physical Exam  Vitals and nursing note reviewed. Constitutional:       General: She is not in acute distress. Appearance: Normal appearance. She is obese. She is not ill-appearing or toxic-appearing. HENT:      Mouth/Throat:      Mouth: Mucous membranes are moist.      Pharynx: Oropharynx is clear. Eyes:      Conjunctiva/sclera: Conjunctivae normal.      Pupils: Pupils are equal, round, and reactive to light. Cardiovascular:      Rate and Rhythm: Normal rate and regular rhythm. Pulses: Normal pulses. Heart sounds: Normal heart sounds. Pulmonary:      Effort: Pulmonary effort is normal. No respiratory distress. Breath sounds: Normal breath sounds. No wheezing. Skin:     General: Skin is warm and dry. Neurological:      General: No focal deficit present. Mental Status: She is alert and oriented to person, place, and time. Mental status is at baseline. Cranial Nerves: No cranial nerve deficit. Sensory: No sensory deficit. Motor: No weakness. Gait: Gait normal.          ASSESSMENT/ PLAN   Below is the assessment and plan developed based on review of pertinent history, physical exam, labs, studies, and medications.       1. Primary hypertension  -     Patient wants to stop taking HCTZ 25mg. I changed her HCTZ to Losartan 25mg. Potential side effects were discussed. - Will recheck CBC, CMP.   - CBC WITH AUTOMATED DIFF; Future  -     METABOLIC PANEL, COMPREHENSIVE; Future  -     losartan (COZAAR) 25 mg tablet; Take 1 Tablet by mouth daily. , Normal, Disp-30 Tablet, R-1  -     amLODIPine (NORVASC) 5 mg tablet; Take 1 Tablet by mouth daily. , Normal, Disp-90 Tablet, R-0  2. Elevated LDL cholesterol level  - I encouraged her to get at least 150 minutes of exercise each week. Recommend low fat diet with focus on portion control. 3. Class 2 severe obesity due to excess calories with serious comorbidity and body mass index (BMI) of 36.0 to 36.9 in [facility])  - Lifestyle modifications encouraged as above. 4. Abnormal vaginal bleeding  -     Taking Progestin only OCP. Needs to follow up with Ob-Gyn. She plans to call and schedule a follow up today. Will check labs. - CBC WITH AUTOMATED DIFF; Future  -     IRON PROFILE; Future         Return to clinic in 4 weeks for BP check. Sooner prn pending lab results. Disclaimer:  Advised patient to call back or return to office if symptoms worsen/change/persist.  Discussed expected course/resolution/complications of diagnosis in detail with patient. Medication risks/benefits/alternatives discussed with patient. Patient was given an after visit summary which includes diagnoses, current medications, & vitals. Discussed patient instructions and advised to read to all patient instructions regarding care. Patient expressed understanding with the diagnosis and plan.        Jose Ramos NP  1/27/2023

## 2023-03-10 ENCOUNTER — OFFICE VISIT (OUTPATIENT)
Dept: PRIMARY CARE CLINIC | Age: 33
End: 2023-03-10
Payer: MEDICAID

## 2023-03-10 VITALS
HEART RATE: 89 BPM | BODY MASS INDEX: 39.23 KG/M2 | TEMPERATURE: 97.5 F | RESPIRATION RATE: 18 BRPM | WEIGHT: 213.2 LBS | OXYGEN SATURATION: 96 % | SYSTOLIC BLOOD PRESSURE: 129 MMHG | DIASTOLIC BLOOD PRESSURE: 84 MMHG | HEIGHT: 62 IN

## 2023-03-10 DIAGNOSIS — I10 PRIMARY HYPERTENSION: Primary | ICD-10-CM

## 2023-03-10 DIAGNOSIS — J02.9 SORE THROAT: ICD-10-CM

## 2023-03-10 DIAGNOSIS — R05.8 DRY COUGH: ICD-10-CM

## 2023-03-10 DIAGNOSIS — R12 HEARTBURN: ICD-10-CM

## 2023-03-10 DIAGNOSIS — F43.21 GRIEF REACTION: ICD-10-CM

## 2023-03-10 PROCEDURE — 3079F DIAST BP 80-89 MM HG: CPT

## 2023-03-10 PROCEDURE — 3074F SYST BP LT 130 MM HG: CPT

## 2023-03-10 PROCEDURE — 99214 OFFICE O/P EST MOD 30 MIN: CPT

## 2023-03-10 RX ORDER — SERTRALINE HYDROCHLORIDE 50 MG/1
50 TABLET, FILM COATED ORAL DAILY
Qty: 30 TABLET | Refills: 1 | Status: SHIPPED | OUTPATIENT
Start: 2023-03-10

## 2023-03-10 RX ORDER — OMEPRAZOLE 20 MG/1
20 CAPSULE, DELAYED RELEASE ORAL DAILY
Qty: 30 CAPSULE | Refills: 1 | Status: SHIPPED | OUTPATIENT
Start: 2023-03-10

## 2023-03-10 RX ORDER — BENZONATATE 200 MG/1
200 CAPSULE ORAL
Qty: 21 CAPSULE | Refills: 0 | Status: SHIPPED | OUTPATIENT
Start: 2023-03-10 | End: 2023-03-17

## 2023-03-10 NOTE — PROGRESS NOTES
Identified pt with two pt identifiers(name and ). Chief Complaint   Patient presents with    Follow-up        3 most recent PHQ Screens 3/10/2023   Little interest or pleasure in doing things Several days   Feeling down, depressed, irritable, or hopeless Several days   Total Score PHQ 2 2        Vitals:    03/10/23 1100   BP: 129/84   Pulse: 89   Resp: 18   Temp: 97.5 °F (36.4 °C)   TempSrc: Temporal   SpO2: 96%   Weight: 213 lb 3.2 oz (96.7 kg)   Height: 5' 2\" (1.575 m)   PainSc:   0 - No pain   LMP: 2023       Health Maintenance Due   Topic Date Due    DTaP/Tdap/Td series (1 - Tdap) Never done    COVID-19 Vaccine (3 - Booster for Landis Peter series) 2021        1. Have you been to the ER, urgent care clinic since your last visit? Hospitalized since your last visit? Yes Reason for visit: strep    2. Have you seen or consulted any other health care providers outside of the [address] [place] [address] since your last visit? Include any pap smears or colon screening. Yes [facility]    3. For patients aged 39-70: Has the patient had a colonoscopy / FIT/ Cologuard? NA - based on age      If the patient is female:    4. For patients aged 41-77: Has the patient had a mammogram within the past 2 years? NA - based on age or sex      11. For patients aged 21-65: Has the patient had a pap smear?  Yes - no Care Gap present

## 2023-03-10 NOTE — PROGRESS NOTES
[facility]   [address], [address] [facility]  P: [phone]  F: [phone]    SUBJECTIVE     HPI:     Clarisse Antoine is a 34 y.o. female who is seen in the clinic for   Chief Complaint   Patient presents with    Follow-up        Established patient here for a follow up. She has a PMHx of HTN, frequent headaches, ERASMO, and obesity. Last seen by me 1/27/2023. Her PHQ score is elevated. She recently lost her sister to an accidental overdose. Sister left two young children behind that her parents are caring for. She has support from family members, her , friends. She has never taken a medication for depression but feels like she may need to. She c/o dry cough. She recently was treated for strep pharyngitis and completed her antibiotics. States her son is home with strep now. Cough is not productive. She also c/o epigastric burning sensation. Has had GERD in the past. She has not taken any medication for reflux recently. Would like to try a medication. HTN: Her BP is 129/84 today. At her previous appointment patient stated she would like to stop taking HCTZ and switch to another agent to control her BP. I switched her to Losartan 25mg daily and continued her Amlodipine 5mg. She has been doing well with Losartan, felt a little lightheaded initially but this has since resolved. At her previous appointment she c/o abnormal vaginal bleeding, was bleeding for 11 days. She has a hx of ERASMO. I encouraged her to follow up with her Ob-Gyn. 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 She was told this was residual after her pregnancy. She will have a D&C for this, which has yet to be schedule. There are no problems to display for this patient. Past Medical History:   Diagnosis Date    Gestational hypertension      History reviewed. No pertinent surgical history.   Social History     Socioeconomic History    Marital status: UNKNOWN     Spouse name: Not on file    Number of children: Not on file    Years of education: Not on file    Highest education level: Not on file   Occupational History    Not on file   Tobacco Use    Smoking status: Never    Smokeless tobacco: Never   Vaping Use    Vaping Use: Never used   Substance and Sexual Activity    Alcohol use: Yes     Comment: socially     Drug use: Never    Sexual activity: Yes     Birth control/protection: Pill   Other Topics Concern    Not on file   Social History Narrative    Not on file     Social Determinants of Health     Financial Resource Strain: Medium Risk    Difficulty of Paying Living Expenses: Somewhat hard   Food Insecurity: Food Insecurity Present    Worried About Running Out of Food in the Last Year: Never true    Ran Out of Food in the Last Year: Sometimes true   Transportation Needs: Not on file   Physical Activity: Insufficiently Active    Days of Exercise per Week: 2 days    Minutes of Exercise per Session: 10 min   Stress: Not on file   Social Connections: Not on file   Intimate Partner Violence: Not At Risk    Fear of Current or Ex-Partner: No    Emotionally Abused: No    Physically Abused: No    Sexually Abused: No   Housing Stability: Not on file     History reviewed. No pertinent family history. Immunization History   Administered Date(s) Administered    COVID-19, PFIZER PURPLE top, DILUTE for use, (age 15 y+), IM, 30mcg/0.3mL 09/18/2021, 10/09/2021      No Known Allergies    No visits with results within 3 Month(s) from this visit. Latest known visit with results is:   Office Visit on 09/07/2022   Component Date Value Ref Range Status    TSH 12/07/2022 1.110  0.450 - 4.500 uIU/mL Final    Comment: No apparent thyroid disorder. Additional testing not indicated. In  rare instances, Secondary Hypothyroidism as well as Subclinical  Hypothyroidism have been reported in some patients with normal TSH  values.       WBC 12/07/2022 10.1  3.4 - 10.8 x10E3/uL Final    RBC 12/07/2022 4.84 3.77 - 5.28 x10E6/uL Final    HGB 12/07/2022 11.8  11.1 - 15.9 g/dL Final    HCT 12/07/2022 35.9  34.0 - 46.6 % Final    MCV 12/07/2022 74 (A)  79 - 97 fL Final    MCH 12/07/2022 24.4 (A)  26.6 - 33.0 pg Final    MCHC 12/07/2022 32.9  31.5 - 35.7 g/dL Final    RDW 12/07/2022 15.5 (A)  11.7 - 15.4 % Final    PLATELET 13/84/2957 417  150 - 450 x10E3/uL Final    NEUTROPHILS 12/07/2022 62  Not Estab. % Final    Lymphocytes 12/07/2022 30  Not Estab. % Final    MONOCYTES 12/07/2022 6  Not Estab. % Final    EOSINOPHILS 12/07/2022 1  Not Estab. % Final    BASOPHILS 12/07/2022 1  Not Estab. % Final    ABS. NEUTROPHILS 12/07/2022 6.3  1.4 - 7.0 x10E3/uL Final    Abs Lymphocytes 12/07/2022 3.1  0.7 - 3.1 x10E3/uL Final    ABS. MONOCYTES 12/07/2022 0.6  0.1 - 0.9 x10E3/uL Final    ABS. EOSINOPHILS 12/07/2022 0.1  0.0 - 0.4 x10E3/uL Final    ABS. BASOPHILS 12/07/2022 0.1  0.0 - 0.2 x10E3/uL Final    IMMATURE GRANULOCYTES 12/07/2022 0  Not Estab. % Final    ABS. IMM. GRANS. 12/07/2022 0.0  0.0 - 0.1 x10E3/uL Final    Glucose 12/07/2022 91  70 - 99 mg/dL Final    BUN 12/07/2022 8  6 - 20 mg/dL Final    Creatinine 12/07/2022 0.93  0.57 - 1.00 mg/dL Final    eGFR 12/07/2022 83  >59 mL/min/1.73 Final    BUN/Creatinine ratio 12/07/2022 9  9 - 23 Final    Sodium 12/07/2022 141  134 - 144 mmol/L Final    Potassium 12/07/2022 3.7  3.5 - 5.2 mmol/L Final    Chloride 12/07/2022 101  96 - 106 mmol/L Final    CO2 12/07/2022 25  20 - 29 mmol/L Final    Calcium 12/07/2022 9.0  8.7 - 10.2 mg/dL Final    Protein, total 12/07/2022 7.3  6.0 - 8.5 g/dL Final    Albumin 12/07/2022 4.4  3.8 - 4.8 g/dL Final    GLOBULIN, TOTAL 12/07/2022 2.9  1.5 - 4.5 g/dL Final    A-G Ratio 12/07/2022 1.5  1.2 - 2.2 Final    Bilirubin, total 12/07/2022 0.4  0.0 - 1.2 mg/dL Final    Alk.  phosphatase 12/07/2022 93  44 - 121 IU/L Final    AST (SGOT) 12/07/2022 13  0 - 40 IU/L Final    ALT (SGPT) 12/07/2022 15  0 - 32 IU/L Final    Hep C Virus Ab 12/07/2022 0.1  0.0 - 0.9 s/co ratio Final    Comment:                                   Negative:     < 0.8                               Indeterminate: 0.8 - 0.9                                    Positive:     > 0.9   HCV antibody alone does not differentiate between   previous resolved infection and active infection. The CDC and current clinical guidelines recommend   that a positive HCV antibody result be followed up   with an HCV RNA test to support the diagnosis of   acute HCV infection. [facility] offers Hepatitis C   Virus (HCV) RNA, Diagnosis, BRIDGETTE (138253) and   Hepatitis C Virus (HCV) Antibody with reflex to   Quantitative Real-time PCR (270675). Cholesterol, total 12/07/2022 170  100 - 199 mg/dL Final    Triglyceride 12/07/2022 94  0 - 149 mg/dL Final    HDL Cholesterol 12/07/2022 44  >39 mg/dL Final    VLDL, calculated 12/07/2022 17  5 - 40 mg/dL Final    LDL, calculated 12/07/2022 109 (A)  0 - 99 mg/dL Final    VITAMIN D, 25-HYDROXY 12/07/2022 16.3 (A)  30.0 - 100.0 ng/mL Final    Comment: Vitamin D deficiency has been defined by the [address] practice guideline as a  level of serum 25-OH vitamin D less than 20 ng/mL (1,2). The Endocrine Society went on to further define vitamin D  insufficiency as a level between 21 and 29 ng/mL (2). 1. IOM ([employer]). 2010. Dietary reference     intakes for calcium and D. [facility]: The     [employer]. 2. Camelia MF, Lizet NC, Raegan RANDALL, et al.     Evaluation, treatment, and prevention of vitamin D     deficiency: an Endocrine Society clinical practice     guideline. JCEM. 2011 Jul; 96(7):1911-30. XR CHEST PA LAT  Narrative: INDICATION:  chest pain. EXAM: 2 VIEW CHEST RADIOGRAPH. COMPARISON: None. FINDINGS: Image quality is degraded by nonstandard positioning in the lateral  view. Frontal and lateral views of the chest show clear lungs.  . The heart,  mediastinum and pulmonary vasculature are upper normal.  The bony thorax is  unremarkable for age. ..  Impression:  No acute cardiopulmonary disease radiographically. .  . Current Outpatient Medications   Medication Sig Dispense Refill    losartan (COZAAR) 25 mg tablet Take 1 Tablet by mouth daily. 30 Tablet 1    amLODIPine (NORVASC) 5 mg tablet Take 1 Tablet by mouth daily. 90 Tablet 0    ergocalciferol (ERGOCALCIFEROL) 1,250 mcg (50,000 unit) capsule Take 1 Capsule by mouth every seven (7) days. 8 Capsule 0    drospirenone, contraceptive, (Slynd) 4 mg (28) tab Take  by mouth. naproxen sodium 220 mg cap Take  by mouth. dicyclomine (BENTYL) 10 mg capsule Take 2 Capsules by mouth four (4) times daily as needed for Abdominal Cramps. 12 Capsule 0           The past medical history, past surgical history, and family history were reviewed and updated in the medical record. Lab values/Imaging were reviewed. The medications were reviewed and updated in the medical record. Immunizations were reviewed and updated in the medical record. All relevant preventative screenings reviewed and updated in the medical record. REVIEW OF SYSTEMS   Review of Systems   Constitutional:  Negative for chills, diaphoresis, fever, malaise/fatigue and weight loss. HENT:  Positive for sore throat. Negative for congestion, ear pain and sinus pain. +ear fullness   Respiratory:  Positive for cough. Negative for shortness of breath and wheezing. Cardiovascular:  Negative for chest pain, palpitations and leg swelling. Gastrointestinal:  Negative for abdominal pain, constipation, diarrhea, nausea and vomiting. Neurological:  Negative for dizziness and headaches. Endo/Heme/Allergies:  Negative for environmental allergies.        PHYSICAL EXAM   /84 (BP 1 Location: Right upper arm, BP Patient Position: Sitting, BP Cuff Size: Large adult)   Pulse 89   Temp 97.5 °F (36.4 °C) (Temporal)   Resp 18   Ht 5' 2\" (1.575 m)   Wt 213 lb 3.2 oz (96.7 kg) LMP 01/17/2023 (Exact Date)   SpO2 96%   BMI 38.99 kg/m²      Physical Exam  Vitals and nursing note reviewed. Constitutional:       Appearance: Normal appearance. She is obese. HENT:      Nose: Nose normal. No congestion. Mouth/Throat:      Mouth: Mucous membranes are moist. No oral lesions. Tongue: No lesions. Tongue does not deviate from midline. Palate: No mass and lesions. Pharynx: Oropharynx is clear. Uvula midline. No posterior oropharyngeal erythema. Tonsils: No tonsillar exudate or tonsillar abscesses. Eyes:      Conjunctiva/sclera: Conjunctivae normal.      Pupils: Pupils are equal, round, and reactive to light. Cardiovascular:      Rate and Rhythm: Normal rate and regular rhythm. Pulses: Normal pulses. Heart sounds: Normal heart sounds. No murmur heard. Pulmonary:      Effort: Pulmonary effort is normal. No respiratory distress. Breath sounds: Normal breath sounds. No wheezing or rales. Lymphadenopathy:      Cervical: No cervical adenopathy. Skin:     General: Skin is warm and dry. Neurological:      General: No focal deficit present. Mental Status: She is alert and oriented to person, place, and time. Mental status is at baseline. Cranial Nerves: No cranial nerve deficit. Sensory: No sensory deficit. Motor: No weakness. Gait: Gait normal.   Psychiatric:         Mood and Affect: Mood normal.         Behavior: Behavior normal.          ASSESSMENT/ PLAN   Below is the assessment and plan developed based on review of pertinent history, physical exam, labs, studies, and medications. 1. Primary hypertension  - BP at goal in office. Continue Losartan 25mg and Amlodipine 5mg daily. Losartan well tolerated. 2. Grief reaction  -     We discussed the risks/benefits of starting a medication for grief. I prescribed Zoloft 50mg daily. Potential side effects were discussed. - I provided her with resources for counseling. - sertraline (ZOLOFT) 50 mg tablet; Take 1 Tablet by mouth daily. , Normal, Disp-30 Tablet, R-1  3. Sore throat  - Offered rapid strep testing, patient declines. Recently completed antibiotic course for strep with improvement in symptoms.   - Can try Cepacol cough drops, throat lozenges, warm salt water gargles. - Return to clinic if symptoms worsen or new symptoms present. 4. Heartburn  -     I prescribed Prilosec. Potential side effects were discussed. - omeprazole (PRILOSEC) 20 mg capsule; Take 1 Capsule by mouth daily. , Normal, Disp-30 Capsule, R-1  5. Dry cough  -     Discussed cough may be d/t #4. No nasal congestion, wheezing, chest tightness, no hx of asthma.  - Can try Tessalon PRN. Potential side effects were discussed. - Return to clinic if symptoms worsen or new symptoms present. - benzonatate (TESSALON) 200 mg capsule; Take 1 Capsule by mouth three (3) times daily as needed for Cough for up to 7 days. , Normal, Disp-21 Capsule, R-0               Disclaimer:  Advised patient to call back or return to office if symptoms worsen/change/persist.  Discussed expected course/resolution/complications of diagnosis in detail with patient. Medication risks/benefits/alternatives discussed with patient. Patient was given an after visit summary which includes diagnoses, current medications, & vitals. Discussed patient instructions and advised to read to all patient instructions regarding care. Patient expressed understanding with the diagnosis and plan.        Blaze Gonzalez NP  3/10/2023

## 2023-03-22 DIAGNOSIS — I10 PRIMARY HYPERTENSION: ICD-10-CM

## 2023-03-22 RX ORDER — LOSARTAN POTASSIUM 25 MG/1
25 TABLET ORAL DAILY
Qty: 30 TABLET | Refills: 1 | Status: SHIPPED | OUTPATIENT
Start: 2023-03-22

## 2023-03-22 RX ORDER — AMLODIPINE BESYLATE 5 MG/1
5 TABLET ORAL DAILY
Qty: 90 TABLET | Refills: 0 | Status: SHIPPED | OUTPATIENT
Start: 2023-03-22

## 2023-03-22 NOTE — TELEPHONE ENCOUNTER
PCP: Jayro Del Angel NP    Last appt: 3/10/2023  No future appointments. Requested Prescriptions     Pending Prescriptions Disp Refills    losartan (COZAAR) 25 mg tablet 30 Tablet 1     Sig: Take 1 Tablet by mouth daily. amLODIPine (NORVASC) 5 mg tablet 90 Tablet 0     Sig: Take 1 Tablet by mouth daily.          Other Comments:

## 2023-03-25 LAB
ALBUMIN SERPL-MCNC: 4.5 G/DL (ref 3.8–4.8)
ALBUMIN/GLOB SERPL: 1.4 {RATIO} (ref 1.2–2.2)
ALP SERPL-CCNC: 79 IU/L (ref 44–121)
ALT SERPL-CCNC: 15 IU/L (ref 0–32)
AST SERPL-CCNC: 12 IU/L (ref 0–40)
BASOPHILS # BLD AUTO: 0.1 X10E3/UL (ref 0–0.2)
BASOPHILS NFR BLD AUTO: 1 %
BILIRUB SERPL-MCNC: 0.4 MG/DL (ref 0–1.2)
BUN SERPL-MCNC: 6 MG/DL (ref 6–20)
BUN/CREAT SERPL: 7 (ref 9–23)
CALCIUM SERPL-MCNC: 9.4 MG/DL (ref 8.7–10.2)
CHLORIDE SERPL-SCNC: 105 MMOL/L (ref 96–106)
CO2 SERPL-SCNC: 21 MMOL/L (ref 20–29)
CREAT SERPL-MCNC: 0.84 MG/DL (ref 0.57–1)
EGFRCR SERPLBLD CKD-EPI 2021: 94 ML/MIN/1.73
EOSINOPHIL # BLD AUTO: 0.1 X10E3/UL (ref 0–0.4)
EOSINOPHIL NFR BLD AUTO: 1 %
ERYTHROCYTE [DISTWIDTH] IN BLOOD BY AUTOMATED COUNT: 16 % (ref 11.7–15.4)
GLOBULIN SER CALC-MCNC: 3.2 G/DL (ref 1.5–4.5)
GLUCOSE SERPL-MCNC: 87 MG/DL (ref 70–99)
HCT VFR BLD AUTO: 37.4 % (ref 34–46.6)
HGB BLD-MCNC: 12.3 G/DL (ref 11.1–15.9)
IMM GRANULOCYTES # BLD AUTO: 0 X10E3/UL (ref 0–0.1)
IMM GRANULOCYTES NFR BLD AUTO: 0 %
LYMPHOCYTES # BLD AUTO: 2.8 X10E3/UL (ref 0.7–3.1)
LYMPHOCYTES NFR BLD AUTO: 34 %
MCH RBC QN AUTO: 25.4 PG (ref 26.6–33)
MCHC RBC AUTO-ENTMCNC: 32.9 G/DL (ref 31.5–35.7)
MCV RBC AUTO: 77 FL (ref 79–97)
MONOCYTES # BLD AUTO: 0.5 X10E3/UL (ref 0.1–0.9)
MONOCYTES NFR BLD AUTO: 6 %
NEUTROPHILS # BLD AUTO: 5 X10E3/UL (ref 1.4–7)
NEUTROPHILS NFR BLD AUTO: 58 %
PLATELET # BLD AUTO: 289 X10E3/UL (ref 150–450)
POTASSIUM SERPL-SCNC: 4.4 MMOL/L (ref 3.5–5.2)
PROT SERPL-MCNC: 7.7 G/DL (ref 6–8.5)
RBC # BLD AUTO: 4.84 X10E6/UL (ref 3.77–5.28)
SODIUM SERPL-SCNC: 141 MMOL/L (ref 134–144)
WBC # BLD AUTO: 8.5 X10E3/UL (ref 3.4–10.8)

## 2023-05-16 RX ORDER — LOSARTAN POTASSIUM 25 MG/1
TABLET ORAL
Qty: 90 TABLET | Refills: 0 | Status: SHIPPED | OUTPATIENT
Start: 2023-05-16

## 2023-05-16 RX ORDER — OMEPRAZOLE 20 MG/1
20 CAPSULE, DELAYED RELEASE ORAL DAILY
Qty: 90 CAPSULE | Refills: 0 | Status: SHIPPED | OUTPATIENT
Start: 2023-05-16 | End: 2023-05-17 | Stop reason: SDUPTHER

## 2023-05-16 NOTE — TELEPHONE ENCOUNTER
PCP: RALF Gilmore NP    Last appt: [unfilled]  No future appointments. Requested Prescriptions     Pending Prescriptions Disp Refills    losartan (COZAAR) 25 MG tablet [Pharmacy Med Name: LOSARTAN POT 25MG] 30 tablet 0     Sig: TAKE 1 TABLET BY MOUTH EVERY DAY    omeprazole (PRILOSEC) 20 MG delayed release capsule [Pharmacy Med Name: OMEPRAZOLE 20MG] 30 capsule 0     Sig: TAKE 1 CAPSULE BY MOUTH DAILY.          Other Comments: last refill   Cozaar 03/22/23  Omeprazole 03/10/23

## 2023-05-17 DIAGNOSIS — R12 HEARTBURN: Primary | ICD-10-CM

## 2023-05-17 RX ORDER — OMEPRAZOLE 20 MG/1
20 CAPSULE, DELAYED RELEASE ORAL DAILY
Qty: 30 CAPSULE | Refills: 0 | Status: SHIPPED | OUTPATIENT
Start: 2023-05-17

## 2023-05-17 NOTE — TELEPHONE ENCOUNTER
PCP: RALF Rodriguez - NP    Last appt: [unfilled]  No future appointments.     Requested Prescriptions     Pending Prescriptions Disp Refills    omeprazole (PRILOSEC) 20 MG delayed release capsule 90 capsule 0     Sig: Take 1 capsule by mouth daily         Other Comments: insurance will only cover a t 30 day supply

## 2023-06-20 DIAGNOSIS — I10 ESSENTIAL HYPERTENSION: Primary | ICD-10-CM

## 2023-06-20 RX ORDER — LOSARTAN POTASSIUM 25 MG/1
25 TABLET ORAL DAILY
Qty: 30 TABLET | Refills: 0 | OUTPATIENT
Start: 2023-06-20

## 2023-06-21 RX ORDER — LOSARTAN POTASSIUM 25 MG/1
TABLET ORAL
Qty: 90 TABLET | Refills: 0 | Status: SHIPPED | OUTPATIENT
Start: 2023-06-21

## 2023-08-18 RX ORDER — AMLODIPINE BESYLATE 5 MG/1
TABLET ORAL
Qty: 30 TABLET | Refills: 0 | Status: SHIPPED | OUTPATIENT
Start: 2023-08-18

## 2023-11-28 DIAGNOSIS — R12 HEARTBURN: ICD-10-CM

## 2023-11-28 RX ORDER — OMEPRAZOLE 20 MG/1
20 CAPSULE, DELAYED RELEASE ORAL DAILY
Qty: 30 CAPSULE | Refills: 0 | Status: SHIPPED | OUTPATIENT
Start: 2023-11-28

## 2025-04-28 ENCOUNTER — HOSPITAL ENCOUNTER (EMERGENCY)
Facility: HOSPITAL | Age: 35
Discharge: HOME | End: 2025-04-28
Attending: EMERGENCY MEDICINE
Payer: MEDICAID

## 2025-04-28 VITALS
BODY MASS INDEX: 41.53 KG/M2 | DIASTOLIC BLOOD PRESSURE: 98 MMHG | TEMPERATURE: 98.9 F | WEIGHT: 227.07 LBS | OXYGEN SATURATION: 98 % | HEART RATE: 78 BPM | SYSTOLIC BLOOD PRESSURE: 148 MMHG | RESPIRATION RATE: 18 BRPM

## 2025-04-28 DIAGNOSIS — I10 ESSENTIAL HYPERTENSION: ICD-10-CM

## 2025-04-28 DIAGNOSIS — R22.0 LEFT FACIAL SWELLING: Primary | ICD-10-CM

## 2025-04-28 PROCEDURE — 99283 EMERGENCY DEPT VISIT LOW MDM: CPT

## 2025-04-28 RX ORDER — GINSENG 100 MG
CAPSULE ORAL 3 TIMES DAILY
Qty: 28 G | Refills: 0 | Status: SHIPPED | OUTPATIENT
Start: 2025-04-28 | End: 2025-05-05

## 2025-04-28 RX ORDER — LORATADINE 10 MG/1
10 TABLET ORAL DAILY
Qty: 30 TABLET | Refills: 0 | Status: SHIPPED | OUTPATIENT
Start: 2025-04-28 | End: 2025-05-28

## 2025-04-28 NOTE — ED PROVIDER NOTES
[facility] EMERGENCY DEPARTMENT  EMERGENCY DEPARTMENT ENCOUNTER      Pt Name: Carmella Solomon  MRN: [id number]  Birthdate 1990  Date of evaluation: 4/28/2025  Provider: Madhu Fonseca MD    CHIEF COMPLAINT       Chief Complaint   Patient presents with    Hypertension    Facial Swelling       ALLERGIES     Patient has no known allergies.    ENCOUNTER     HISTORY OF PRESENT ILLNESS:  A 34-year-old female with a history of hypertension presented to the Emergency Department with facial swelling on the left side, which began yesterday morning. The patient provided her own history, noting that she initially thought the swelling was sinus-related. She has a history of similar swelling a month ago after consuming crabs. The swelling is located on the same side as a left nose piercing, which has been in place for a year. She mentioned previous issues with the initial jewelry used for the piercing. There has been no recent exposure to known allergens, and the swelling is not associated with dental issues or a sinus infection. Prior to arrival, she took amlodipine and losartan, which reduced her systolic blood pressure from 190 to the 160s, and she also used Benadryl.    PAST MEDICAL HISTORY:  - Hypertension    SOCIAL HISTORY:  - Denies smoking and tobacco use    PHYSICAL EXAM:  General Appearance: Alert and oriented, in no acute distress.    Head: Minimal non-pitting edema of the left nasolabial fold.    Eyes: No conjunctival injection.    Nose: No nasal discharge or obstruction.  Left nose piercing in place with no erythema, edema, fluctuance, induration, or warmth.    Mouth/Throat: Teeth appear normal, no oropharyngeal erythema or exudate.    Musculoskeletal: Normal range of motion, no tenderness or deformities.    Skin: No rashes or lesions, except for noted facial swelling.    Neurological: Alert and oriented, cranial nerves II-XII intact.    Psychiatric: Appropriate affect and demeanor.    SUMMARY:  The patient, a

## 2025-04-28 NOTE — ED TRIAGE NOTES
Pt reports Left facial swelling yesterday in the morning. Took 2 doses of benadryl and has improved. Also has high bp and headache but not taking her meds. Blood pressure was 181 systolic, took bp meds (amlodipine and losartan) around 10pm. Denies chest pain, sob or difficulty swallowing.

## 2025-08-28 ENCOUNTER — OFFICE VISIT (OUTPATIENT)
Dept: PRIMARY CARE CLINIC | Facility: CLINIC | Age: 35
End: 2025-08-28

## 2025-08-28 VITALS
HEART RATE: 83 BPM | DIASTOLIC BLOOD PRESSURE: 98 MMHG | HEIGHT: 62 IN | RESPIRATION RATE: 16 BRPM | WEIGHT: 200.2 LBS | OXYGEN SATURATION: 98 % | BODY MASS INDEX: 36.84 KG/M2 | SYSTOLIC BLOOD PRESSURE: 160 MMHG | TEMPERATURE: 97.5 F

## 2025-08-28 DIAGNOSIS — I10 ESSENTIAL HYPERTENSION: Primary | ICD-10-CM

## 2025-08-28 DIAGNOSIS — E66.01 CLASS 2 SEVERE OBESITY DUE TO EXCESS CALORIES WITH SERIOUS COMORBIDITY AND BODY MASS INDEX (BMI) OF 36.0 TO 36.9 IN ADULT (HCC): ICD-10-CM

## 2025-08-28 DIAGNOSIS — E66.812 CLASS 2 SEVERE OBESITY DUE TO EXCESS CALORIES WITH SERIOUS COMORBIDITY AND BODY MASS INDEX (BMI) OF 36.0 TO 36.9 IN ADULT (HCC): ICD-10-CM

## 2025-08-28 DIAGNOSIS — I10 ESSENTIAL HYPERTENSION: ICD-10-CM

## 2025-08-28 DIAGNOSIS — R11.0 NAUSEA: ICD-10-CM

## 2025-08-28 DIAGNOSIS — Z91.013 SHELLFISH ALLERGY: ICD-10-CM

## 2025-08-28 RX ORDER — LOSARTAN POTASSIUM 25 MG/1
25 TABLET ORAL DAILY
Qty: 90 TABLET | Refills: 0 | Status: SHIPPED | OUTPATIENT
Start: 2025-08-28

## 2025-08-28 RX ORDER — AMLODIPINE BESYLATE 5 MG/1
5 TABLET ORAL DAILY
Qty: 90 TABLET | Refills: 0 | Status: SHIPPED | OUTPATIENT
Start: 2025-08-28

## 2025-08-28 RX ORDER — MEDROXYPROGESTERONE ACETATE 150 MG/ML
150 INJECTION, SUSPENSION INTRAMUSCULAR
COMMUNITY

## 2025-08-28 SDOH — ECONOMIC STABILITY: FOOD INSECURITY: WITHIN THE PAST 12 MONTHS, THE FOOD YOU BOUGHT JUST DIDN'T LAST AND YOU DIDN'T HAVE MONEY TO GET MORE.: NEVER TRUE

## 2025-08-28 SDOH — ECONOMIC STABILITY: FOOD INSECURITY: WITHIN THE PAST 12 MONTHS, YOU WORRIED THAT YOUR FOOD WOULD RUN OUT BEFORE YOU GOT MONEY TO BUY MORE.: NEVER TRUE

## 2025-08-28 SDOH — HEALTH STABILITY: PHYSICAL HEALTH: ON AVERAGE, HOW MANY DAYS PER WEEK DO YOU ENGAGE IN MODERATE TO STRENUOUS EXERCISE (LIKE A BRISK WALK)?: 3 DAYS

## 2025-08-28 ASSESSMENT — ENCOUNTER SYMPTOMS
COUGH: 0
CONSTIPATION: 0
SHORTNESS OF BREATH: 0
NAUSEA: 1
WHEEZING: 0
ABDOMINAL PAIN: 0
VOMITING: 0
BLOOD IN STOOL: 0
DIARRHEA: 0

## 2025-08-28 ASSESSMENT — PATIENT HEALTH QUESTIONNAIRE - PHQ9
SUM OF ALL RESPONSES TO PHQ QUESTIONS 1-9: 0
1. LITTLE INTEREST OR PLEASURE IN DOING THINGS: NOT AT ALL
SUM OF ALL RESPONSES TO PHQ QUESTIONS 1-9: 0
2. FEELING DOWN, DEPRESSED OR HOPELESS: NOT AT ALL
SUM OF ALL RESPONSES TO PHQ QUESTIONS 1-9: 0
SUM OF ALL RESPONSES TO PHQ QUESTIONS 1-9: 0